# Patient Record
Sex: FEMALE
[De-identification: names, ages, dates, MRNs, and addresses within clinical notes are randomized per-mention and may not be internally consistent; named-entity substitution may affect disease eponyms.]

---

## 2021-12-04 ENCOUNTER — HOSPITAL ENCOUNTER (EMERGENCY)
Dept: HOSPITAL 90 - EDH | Age: 71
Discharge: HOME | End: 2021-12-04
Payer: COMMERCIAL

## 2021-12-04 VITALS — HEIGHT: 58 IN | BODY MASS INDEX: 21.84 KG/M2 | WEIGHT: 104.06 LBS

## 2021-12-04 VITALS — SYSTOLIC BLOOD PRESSURE: 148 MMHG | DIASTOLIC BLOOD PRESSURE: 73 MMHG

## 2021-12-04 DIAGNOSIS — E86.0: ICD-10-CM

## 2021-12-04 DIAGNOSIS — R68.81: ICD-10-CM

## 2021-12-04 DIAGNOSIS — K80.20: Primary | ICD-10-CM

## 2021-12-04 LAB
ALBUMIN SERPL-MCNC: 3 G/DL (ref 3.5–5)
ALT SERPL-CCNC: 67 U/L (ref 12–78)
AST SERPL-CCNC: 48 U/L (ref 10–37)
BASOPHILS NFR BLD AUTO: 0.3 % (ref 0–5)
BILIRUB SERPL-MCNC: 0.4 MG/DL (ref 0.2–1)
BUN SERPL-MCNC: 25 MG/DL (ref 7–18)
CHLORIDE SERPL-SCNC: 106 MMOL/L (ref 101–111)
CK SERPL-CCNC: 30 U/L (ref 21–232)
CO2 SERPL-SCNC: 25 MMOL/L (ref 21–32)
CREAT SERPL-MCNC: 0.7 MG/DL (ref 0.5–1.5)
EOSINOPHIL NFR BLD AUTO: 0.9 % (ref 0–8)
ERYTHROCYTE [DISTWIDTH] IN BLOOD BY AUTOMATED COUNT: 14 % (ref 11–15.5)
GFR SERPL CREATININE-BSD FRML MDRD: 87 ML/MIN (ref 60–?)
GLUCOSE SERPL-MCNC: 155 MG/DL (ref 70–105)
HCT VFR BLD AUTO: 36 % (ref 36–48)
LIPASE SERPL-CCNC: 169 U/L (ref 114–286)
LYMPHOCYTES NFR SPEC AUTO: 16.8 % (ref 21–51)
MCH RBC QN AUTO: 32.1 PG (ref 27–33)
MCHC RBC AUTO-ENTMCNC: 33.3 G/DL (ref 32–36)
MCV RBC AUTO: 96.3 FL (ref 79–99)
MONOCYTES NFR BLD AUTO: 8.2 % (ref 3–13)
NEUTROPHILS NFR BLD AUTO: 73.5 % (ref 40–77)
NRBC BLD MANUAL-RTO: 0 % (ref 0–0.19)
PH UR STRIP: 5.5 [PH] (ref 5–8)
PLATELET # BLD AUTO: 314 K/UL (ref 130–400)
POTASSIUM SERPL-SCNC: 3.7 MMOL/L (ref 3.5–5.1)
PROT SERPL-MCNC: 7 G/DL (ref 6–8.3)
RBC # BLD AUTO: 3.74 MIL/UL (ref 4–5.5)
RBC #/AREA URNS HPF: (no result) /HPF (ref 0–1)
SODIUM SERPL-SCNC: 141 MMOL/L (ref 136–145)
SP GR UR STRIP: 1.04 (ref 1–1.03)
UROBILINOGEN UR STRIP-MCNC: 1 MG/DL (ref 0.2–1)
WBC # BLD AUTO: 9.3 K/UL (ref 4.8–10.8)
WBC #/AREA URNS HPF: (no result) /HPF (ref 0–1)

## 2021-12-04 PROCEDURE — 80053 COMPREHEN METABOLIC PANEL: CPT

## 2021-12-04 PROCEDURE — 84484 ASSAY OF TROPONIN QUANT: CPT

## 2021-12-04 PROCEDURE — 82550 ASSAY OF CK (CPK): CPT

## 2021-12-04 PROCEDURE — 83690 ASSAY OF LIPASE: CPT

## 2021-12-04 PROCEDURE — 93005 ELECTROCARDIOGRAM TRACING: CPT

## 2021-12-04 PROCEDURE — 81001 URINALYSIS AUTO W/SCOPE: CPT

## 2021-12-04 PROCEDURE — 99285 EMERGENCY DEPT VISIT HI MDM: CPT

## 2021-12-04 PROCEDURE — 71045 X-RAY EXAM CHEST 1 VIEW: CPT

## 2021-12-04 PROCEDURE — 74177 CT ABD & PELVIS W/CONTRAST: CPT

## 2021-12-04 PROCEDURE — 85025 COMPLETE CBC W/AUTO DIFF WBC: CPT

## 2021-12-04 PROCEDURE — 96360 HYDRATION IV INFUSION INIT: CPT

## 2021-12-04 PROCEDURE — 76705 ECHO EXAM OF ABDOMEN: CPT

## 2021-12-04 PROCEDURE — 36415 COLL VENOUS BLD VENIPUNCTURE: CPT

## 2022-08-29 ENCOUNTER — HOSPITAL ENCOUNTER (OUTPATIENT)
Dept: HOSPITAL 90 - DAH | Age: 72
Discharge: HOME | End: 2022-08-29
Attending: SURGERY
Payer: COMMERCIAL

## 2022-08-29 VITALS — DIASTOLIC BLOOD PRESSURE: 81 MMHG | SYSTOLIC BLOOD PRESSURE: 163 MMHG

## 2022-08-29 VITALS — DIASTOLIC BLOOD PRESSURE: 83 MMHG | SYSTOLIC BLOOD PRESSURE: 170 MMHG

## 2022-08-29 VITALS — SYSTOLIC BLOOD PRESSURE: 130 MMHG | DIASTOLIC BLOOD PRESSURE: 63 MMHG

## 2022-08-29 VITALS — SYSTOLIC BLOOD PRESSURE: 137 MMHG | DIASTOLIC BLOOD PRESSURE: 69 MMHG

## 2022-08-29 VITALS — DIASTOLIC BLOOD PRESSURE: 71 MMHG | SYSTOLIC BLOOD PRESSURE: 144 MMHG

## 2022-08-29 VITALS — SYSTOLIC BLOOD PRESSURE: 169 MMHG | DIASTOLIC BLOOD PRESSURE: 85 MMHG

## 2022-08-29 VITALS — DIASTOLIC BLOOD PRESSURE: 73 MMHG | SYSTOLIC BLOOD PRESSURE: 161 MMHG

## 2022-08-29 VITALS — DIASTOLIC BLOOD PRESSURE: 68 MMHG | SYSTOLIC BLOOD PRESSURE: 149 MMHG

## 2022-08-29 VITALS — DIASTOLIC BLOOD PRESSURE: 86 MMHG | SYSTOLIC BLOOD PRESSURE: 152 MMHG

## 2022-08-29 VITALS — DIASTOLIC BLOOD PRESSURE: 59 MMHG | SYSTOLIC BLOOD PRESSURE: 144 MMHG

## 2022-08-29 DIAGNOSIS — Z88.6: ICD-10-CM

## 2022-08-29 DIAGNOSIS — K57.30: ICD-10-CM

## 2022-08-29 DIAGNOSIS — Z79.82: ICD-10-CM

## 2022-08-29 DIAGNOSIS — M19.90: ICD-10-CM

## 2022-08-29 DIAGNOSIS — J45.909: ICD-10-CM

## 2022-08-29 DIAGNOSIS — Z91.040: ICD-10-CM

## 2022-08-29 DIAGNOSIS — Z98.890: ICD-10-CM

## 2022-08-29 DIAGNOSIS — Z88.8: ICD-10-CM

## 2022-08-29 DIAGNOSIS — R10.9: Primary | ICD-10-CM

## 2022-08-29 DIAGNOSIS — Z79.899: ICD-10-CM

## 2022-08-29 DIAGNOSIS — K63.89: ICD-10-CM

## 2022-08-29 DIAGNOSIS — M81.0: ICD-10-CM

## 2022-08-29 DIAGNOSIS — D12.3: ICD-10-CM

## 2022-08-29 DIAGNOSIS — Z83.3: ICD-10-CM

## 2022-08-29 DIAGNOSIS — Z98.891: ICD-10-CM

## 2022-08-29 DIAGNOSIS — Z82.3: ICD-10-CM

## 2022-08-29 PROCEDURE — 87426 SARSCOV CORONAVIRUS AG IA: CPT

## 2022-08-29 PROCEDURE — 45380 COLONOSCOPY AND BIOPSY: CPT

## 2025-02-03 ENCOUNTER — HOSPITAL ENCOUNTER (OUTPATIENT)
Dept: HOSPITAL 90 - RAH | Age: 75
Discharge: HOME | End: 2025-02-03
Attending: SURGERY
Payer: COMMERCIAL

## 2025-02-03 DIAGNOSIS — K80.20: Primary | ICD-10-CM

## 2025-02-03 DIAGNOSIS — R10.9: ICD-10-CM

## 2025-02-03 PROCEDURE — 74177 CT ABD & PELVIS W/CONTRAST: CPT

## 2025-02-03 NOTE — [facility]
Exam Type: CT ABDOMEN/PELVIS W/CONTRAST



Clinical Information: abdominal pain, Calculus of gallbladder w/o

cholecystitis w/o obstruction



Comparison: None



Contrast: 100 cc's Isovue 370 IV, no complications or adverse reactions





CT Dose Index (CTDI): 31.60 mGy

Dose Length Product (DLP): 1740.80 total mGy-cm



Findings:



No evidence of nephro or ureterolithiasis is found. No hydronephrosis or

ureteral dilatation is seen. 



The lung bases are clear. 



The stomach is unremarkable. It shows no wall thickening. No gross

ulceration is seen. It is not overly distended. There are no surrounding

inflammatory changes. No wall lesions are identified to suggest cancer.



The spleen is unremarkable. It is not enlarged.



The pancreas shows normal anatomy. It is not fatty replaced. It shows no

lesions. The pancreatic duct is not dilated.



The gallbladder is unremarkable. It shows no cholelithiasis. The

gallbladder wall is normal in thickness. There is no pericholecystic

fluid. The is no acute or chronic inflammation noted.



The adrenal glands are unremarkable. There is no enlargement. No lesions

are noted. 



The liver is unremarkable. It shows no focal masses. 



The appendix is unremarkable. It shows no evidence of inflammation. No

appendicolith is seen.



The small bowel is unremarkable. There is no evidence of dilatation to

suggest obstruction. No evidence of adynamic ileus is seen. There is no

small bowel wall thickening to suggest enteritis.



The colon is unremarkable.



The urinary bladder is unremarkable. There is no wall thickening to

suggest tumor or inflammation. There are no intraluminal calculi. There

are no diverticula. There is no evidence of chronic bladder outlet

obstruction. There is no evidence of urinary bladder distention to

suggest urinary retention.



The other pelvic structures are unremarkable.



The bony and vascular structures are unremarkable for the patient's age.



     

     

IMPRESSION:

     

NEGATIVE CT SCAN OF THE ABDOMEN AND PELVIS WITH ORAL AND IV CONTRAST.



This study was performed using dose reduction techniques to include

automated exposure control and/or adjustment of the mA and/or kV

according to patient size.